# Patient Record
Sex: MALE | Race: WHITE | Employment: OTHER | URBAN - METROPOLITAN AREA
[De-identification: names, ages, dates, MRNs, and addresses within clinical notes are randomized per-mention and may not be internally consistent; named-entity substitution may affect disease eponyms.]

---

## 2017-05-09 ENCOUNTER — OFFICE VISIT (OUTPATIENT)
Dept: NEUROLOGY | Age: 27
End: 2017-05-09

## 2017-05-09 VITALS
HEART RATE: 68 BPM | BODY MASS INDEX: 22.24 KG/M2 | DIASTOLIC BLOOD PRESSURE: 68 MMHG | SYSTOLIC BLOOD PRESSURE: 98 MMHG | OXYGEN SATURATION: 99 % | WEIGHT: 167.8 LBS | RESPIRATION RATE: 14 BRPM | HEIGHT: 73 IN | TEMPERATURE: 97.8 F

## 2017-05-09 DIAGNOSIS — R51.9 ACUTE NONINTRACTABLE HEADACHE, UNSPECIFIED HEADACHE TYPE: Primary | ICD-10-CM

## 2017-05-09 DIAGNOSIS — R55 VASOVAGAL SYNCOPE: ICD-10-CM

## 2017-05-09 RX ORDER — IBUPROFEN 200 MG
800 TABLET ORAL
COMMUNITY

## 2017-05-09 NOTE — COMMUNICATION BODY
Nicol Roy is a 27 y.o., right handed male, with no past medical history, who on April 20, 2017 had a spell while stationed on a [address]  in the [employer]. He developed a crushing 10/10 headache which basically disabled him. He was attended to by the medics, during which he became poorly conscious. He actually had what was described as a generalized tonic clonic seizure activity. He was seen in the [facility] and then here at [facility].  He had a CT scan of the brain, not any MRI imaging. He's not had no further lose of consciousness. No lumbar puncture was performed in either location. The headache came on gradually, with only a little bit of warning there was some lightheadedness. He continues to have daily headaches. They vary from a low of 5/10 to a severe 10/10. He gets pain in either vertex region. He has nausea but no vomiting. There's no focal weakness or numbness, but feels generalized weakness since onset. He does have a prior history of headache, but they were far a few in between and never this intense. There's no known family history of headaches. Social History; he's single, currently in the [address] [place] [address] Street. Drinks occasionally, no tobacco, illicit drugs. Family History; no history of migraine. Father has no significant history. Mother has no history. Sister with breast cancer. Current Outpatient Prescriptions   Medication Sig Dispense Refill    ibuprofen (MOTRIN) 200 mg tablet Take  by mouth every six (6) hours as needed for Pain. History reviewed. No pertinent past medical history. History reviewed. No pertinent surgical history. No Known Allergies    There is no problem list on file for this patient.         Review of Systems:   As above otherwise 11 point review of systems negative including;   Constitutional no fever or chills  Skin denies rash or itching  HENT  Denies tinnitus, hearing lose  Eyes denies diplopia vision lose  Respiratory denies shortness of breath  Cardiovascular denies chest pain, dyspnea on exertion  Gastrointestinal denies nausea, vomiting, diarrhea, constipation  Genitourinary denies incontinence  Musculoskeletal denies joint pain or swelling  Endocrine denies weight change  Hematology denies easy bruising or bleeding   Neurological as above in HPI      PHYSICAL EXAMINATION:      VITAL SIGNS:    Visit Vitals    BP 98/68 (BP 1 Location: Left arm, BP Patient Position: Sitting)    Pulse 68    Temp 97.8 °F (36.6 °C) (Oral)    Resp 14    Ht 6' 1\" (1.854 m)    Wt 76.1 kg (167 lb 12.8 oz)    SpO2 99%    BMI 22.14 kg/m2       GENERAL: The patient is well developed, well nourished, and in no apparent distress. EXTREMITIES: No clubbing, cyanosis, or edema is identified. Pulses 2+ and symmetrical.  Muscle tone is normal.  HEAD:   Ear, nose, and throat appear to be without trauma. The patient is normocephalic. NEUROLOGIC EXAMINATION    MENTAL STATUS: The patient is awake, alert, and oriented x 4. Fund of knowledge is adequate. Speech is fluent and memory appears to be intact, both long and short term. CRANIAL NERVES: II - Visual fields are full to confrontation. Funduscopic examination reveals flat disks bilaterally. Pupils are both 6 mm and briskly reactive to light and accommodation. III, IV, VI - Extraocular movements are intact and there is no nystagmus. V - Facial sensation is intact to pinprick and light touch. VII - Face is symmetrical.   VIII - Hearing is present. IX, X, XII- Palate rises symmetrically. Gag is present. Tongue is in the midline. XI - Shoulder shrugging and head turning intact  MOTOR:  The patient is 5/5 in all four limbs without any drift. Fine finger movements are symmetrical.  Isolated motor group testing reveals no focal abnormalities. Tone is normal.  Sensory examination is intact to pinprick, light touch and position sense testing. Reflexes are 2+ and symmetrical. Plantars are down going. Cerebellar examination reveals no gross ataxia or dysmetria. Gait is normal and the patient can tandem walk without any difficulty. The discharge summary from [facility] states he had a CTA/CTV which was normal.  I have not seen the records. CBC: No results found for: WBC, RBC, HGB, HCT, PLT, HGBEXT, HCTEXT, PLTEXT  BMP: No results found for: GLU, NA, K, CL, CO2, BUN, CREA, CA  CMP: No results found for: GLU, NA, K, CL, CO2, BUN, CREA, CA, AGAP, BUCR, TBIL, GPT, AP, TP, ALB, GLOB, AGRAT  Coagulation: No results found for: PTP, INR, APTT, PTTT       Impression: Single episode of what sounds like a syncopal/seizure event but with persistent headaches in this man who has risk factors including none. He has no family history of migraines and no significant personal history of anything. I'm most worried that he had an intracranial bleed ie SAH, from either an aneurysm or AVM. Plan: I thing he needs an MRI scan for new onset headaches that are not resolving. He also needs and MRA of the brain looking for a reason for any bleed that may have occurred at that time. Return here in 2 weeks.

## 2017-05-09 NOTE — MR AVS SNAPSHOT
Visit Information Date & Time Provider Department Dept. Phone Encounter #  
 5/9/2017 10:00 AM Ankur [facility] [phone] [id number] Follow-up Instructions Return in about 1 week (around 5/16/2017). Follow-up and Disposition History Upcoming Health Maintenance Date Due DTaP/Tdap/Td series (1 - Tdap) 9/20/2013 INFLUENZA AGE 9 TO ADULT 8/1/2017 Allergies as of 5/9/2017  Review Complete On: 5/9/2017 By: Garima Marinelli LPN No Known Allergies Current Immunizations  Never Reviewed No immunizations on file. Not reviewed this visit You Were Diagnosed With   
  
 Codes Comments Acute nonintractable headache, unspecified headache type    -  Primary ICD-10-CM: R51 ICD-9-CM: 135. 0 Vasovagal syncope     ICD-10-CM: R55 
ICD-9-CM: 780. 2 Vitals BP Pulse Temp Resp Height(growth percentile) Weight(growth percentile) 98/68 (BP 1 Location: Left arm, BP Patient Position: Sitting) 68 97.8 °F (36.6 °C) (Oral) 14 6' 1\" (1.854 m) 167 lb 12.8 oz (76.1 kg) SpO2 BMI Smoking Status 99% 22.14 kg/m2 Never Smoker Vitals History BMI and BSA Data Body Mass Index Body Surface Area  
 22.14 kg/m 2 1.98 m 2 Your Updated Medication List  
  
   
This list is accurate as of: 5/9/17 11:12 AM.  Always use your most recent med list.  
  
  
  
  
 ibuprofen 200 mg tablet Commonly known as:  MOTRIN Take  by mouth every six (6) hours as needed for Pain. Follow-up Instructions Return in about 1 week (around 5/16/2017). To-Do List   
 05/09/2017 Imaging:  MRA BRAIN WO CONT   
  
 05/09/2017 Imaging:  MRI BRAIN WO CONT Introducing [facility]! Hai Ace introduces [employer] patient portal. Now you can access parts of your medical record, email your doctor's office, and request medication refills online.    
 
1. In your internet browser, go to [email or web address] 2. Click on the First Time User? Click Here link in the Sign In box. You will see the New Member Sign Up page. 3. Enter your [employer] Access Code exactly as it appears below. You will not need to use this code after youve completed the sign-up process. If you do not sign up before the expiration date, you must request a new code. · [employer] Access Code: [email or web address] Expires: 8/7/2017  9:35 AM 
 
4. Enter the last four digits of your Social Security Number (xxxx) and Date of Birth (mm/dd/yyyy) as indicated and click Submit. You will be taken to the next sign-up page. 5. Create a [employer] ID. This will be your [employer] login ID and cannot be changed, so think of one that is secure and easy to remember. 6. Create a [employer] password. You can change your password at any time. 7. Enter your Password Reset Question and Answer. This can be used at a later time if you forget your password. 8. Enter your e-mail address. You will receive e-mail notification when new information is available in [address] 9. Click Sign Up. You can now view and download portions of your medical record. 10. Click the Download Summary menu link to download a portable copy of your medical information. If you have questions, please visit the Frequently Asked Questions section of the [employer] website. Remember, [employer] is NOT to be used for urgent needs. For medical emergencies, dial 911. Now available from your iPhone and Android! Please provide this summary of care documentation to your next provider. If you have any questions after today's visit, please call [phone].

## 2017-05-09 NOTE — PROGRESS NOTES
Andrew Almanzar is a 27 y.o., right handed male, with no past medical history, who on April 20, 2017 had a spell while stationed on a [address]  in the [employer]. He developed a crushing 10/10 headache which basically disabled him. He was attended to by the medics, during which he became poorly conscious. He actually had what was described as a generalized tonic clonic seizure activity. He was seen in the [facility] and then here at [facility].  He had a CT scan of the brain, not any MRI imaging. He's not had no further lose of consciousness. No lumbar puncture was performed in either location. The headache came on gradually, with only a little bit of warning there was some lightheadedness. He continues to have daily headaches. They vary from a low of 5/10 to a severe 10/10. He gets pain in either vertex region. He has nausea but no vomiting. There's no focal weakness or numbness, but feels generalized weakness since onset. He does have a prior history of headache, but they were far a few in between and never this intense. There's no known family history of headaches. Social History; he's single, currently in the [address] [place] [address] Street. Drinks occasionally, no tobacco, illicit drugs. Family History; no history of migraine. Father has no significant history. Mother has no history. Sister with breast cancer. Current Outpatient Prescriptions   Medication Sig Dispense Refill    ibuprofen (MOTRIN) 200 mg tablet Take  by mouth every six (6) hours as needed for Pain. History reviewed. No pertinent past medical history. History reviewed. No pertinent surgical history. No Known Allergies    There is no problem list on file for this patient.         Review of Systems:   As above otherwise 11 point review of systems negative including;   Constitutional no fever or chills  Skin denies rash or itching  HENT  Denies tinnitus, hearing lose  Eyes denies diplopia vision lose  Respiratory denies shortness of breath  Cardiovascular denies chest pain, dyspnea on exertion  Gastrointestinal denies nausea, vomiting, diarrhea, constipation  Genitourinary denies incontinence  Musculoskeletal denies joint pain or swelling  Endocrine denies weight change  Hematology denies easy bruising or bleeding   Neurological as above in HPI      PHYSICAL EXAMINATION:      VITAL SIGNS:    Visit Vitals    BP 98/68 (BP 1 Location: Left arm, BP Patient Position: Sitting)    Pulse 68    Temp 97.8 °F (36.6 °C) (Oral)    Resp 14    Ht 6' 1\" (1.854 m)    Wt 76.1 kg (167 lb 12.8 oz)    SpO2 99%    BMI 22.14 kg/m2       GENERAL: The patient is well developed, well nourished, and in no apparent distress. EXTREMITIES: No clubbing, cyanosis, or edema is identified. Pulses 2+ and symmetrical.  Muscle tone is normal.  HEAD:   Ear, nose, and throat appear to be without trauma. The patient is normocephalic. NEUROLOGIC EXAMINATION    MENTAL STATUS: The patient is awake, alert, and oriented x 4. Fund of knowledge is adequate. Speech is fluent and memory appears to be intact, both long and short term. CRANIAL NERVES: II - Visual fields are full to confrontation. Funduscopic examination reveals flat disks bilaterally. Pupils are both 6 mm and briskly reactive to light and accommodation. III, IV, VI - Extraocular movements are intact and there is no nystagmus. V - Facial sensation is intact to pinprick and light touch. VII - Face is symmetrical.   VIII - Hearing is present. IX, X, XII- Palate rises symmetrically. Gag is present. Tongue is in the midline. XI - Shoulder shrugging and head turning intact  MOTOR:  The patient is 5/5 in all four limbs without any drift. Fine finger movements are symmetrical.  Isolated motor group testing reveals no focal abnormalities. Tone is normal.  Sensory examination is intact to pinprick, light touch and position sense testing. Reflexes are 2+ and symmetrical. Plantars are down going. Cerebellar examination reveals no gross ataxia or dysmetria. Gait is normal and the patient can tandem walk without any difficulty. The discharge summary from [facility] states he had a CTA/CTV which was normal.  I have not seen the records. CBC: No results found for: WBC, RBC, HGB, HCT, PLT, HGBEXT, HCTEXT, PLTEXT  BMP: No results found for: GLU, NA, K, CL, CO2, BUN, CREA, CA  CMP: No results found for: GLU, NA, K, CL, CO2, BUN, CREA, CA, AGAP, BUCR, TBIL, GPT, AP, TP, ALB, GLOB, AGRAT  Coagulation: No results found for: PTP, INR, APTT, PTTT       Impression: Single episode of what sounds like a syncopal/seizure event but with persistent headaches in this man who has risk factors including none. He has no family history of migraines and no significant personal history of anything. I'm most worried that he had an intracranial bleed ie SAH, from either an aneurysm or AVM. Plan: I thing he needs an MRI scan for new onset headaches that are not resolving. He also needs and MRA of the brain looking for a reason for any bleed that may have occurred at that time. Return here in 2 weeks.

## 2017-05-10 ENCOUNTER — HOSPITAL ENCOUNTER (OUTPATIENT)
Dept: MRI IMAGING | Age: 27
Discharge: HOME | End: 2017-05-10
Attending: PSYCHIATRY & NEUROLOGY
Payer: OTHER GOVERNMENT

## 2017-05-10 DIAGNOSIS — R55 VASOVAGAL SYNCOPE: ICD-10-CM

## 2017-05-10 DIAGNOSIS — R51.9 ACUTE NONINTRACTABLE HEADACHE, UNSPECIFIED HEADACHE TYPE: ICD-10-CM

## 2017-05-10 PROCEDURE — 70551 MRI BRAIN STEM W/O DYE: CPT

## 2017-05-10 PROCEDURE — 70544 MR ANGIOGRAPHY HEAD W/O DYE: CPT

## 2017-05-11 NOTE — PROGRESS NOTES
Informed patient of results and any/all instructions. Patient denied any further questions and verbalized understanding.

## 2017-05-16 ENCOUNTER — OFFICE VISIT (OUTPATIENT)
Dept: NEUROLOGY | Age: 27
End: 2017-05-16

## 2017-05-16 VITALS
BODY MASS INDEX: 20.62 KG/M2 | OXYGEN SATURATION: 96 % | TEMPERATURE: 98.2 F | SYSTOLIC BLOOD PRESSURE: 110 MMHG | HEIGHT: 75 IN | RESPIRATION RATE: 14 BRPM | DIASTOLIC BLOOD PRESSURE: 72 MMHG | HEART RATE: 79 BPM | WEIGHT: 165.8 LBS

## 2017-05-16 DIAGNOSIS — G43.011 INTRACTABLE MIGRAINE WITHOUT AURA AND WITH STATUS MIGRAINOSUS: Primary | ICD-10-CM

## 2017-05-16 RX ORDER — METHYLPREDNISOLONE 4 MG/1
TABLET ORAL
Qty: 1 DOSE PACK | Refills: 0 | Status: SHIPPED | OUTPATIENT
Start: 2017-05-16 | End: 2017-06-21 | Stop reason: ALTCHOICE

## 2017-05-16 RX ORDER — TOPIRAMATE 25 MG/1
25 TABLET ORAL 2 TIMES DAILY
Qty: 60 TAB | Refills: 2 | Status: SHIPPED | OUTPATIENT
Start: 2017-05-16 | End: 2017-05-26 | Stop reason: SDUPTHER

## 2017-05-16 NOTE — PROGRESS NOTES
Re:  Artist Braxton Boyd,Follow up visit     5/16/2017 9:59 AM    SSN: [id number]    Subjective:   Reena Womack returns for follow up. He's been having a rough time, with continuous headaches and significant nausea over the weekend. Basically he's had a headache that's lasted 3 weeks. No further loss of consciousness spells. Medications:    Current Outpatient Prescriptions   Medication Sig Dispense Refill    ibuprofen (MOTRIN) 200 mg tablet Take  by mouth every six (6) hours as needed for Pain. Vital signs:    Visit Vitals    /72 (BP 1 Location: Left arm, BP Patient Position: Sitting)    Pulse 79    Temp 98.2 °F (36.8 °C) (Oral)    Resp 14    Ht 6' 3\" (1.905 m)    Wt 75.2 kg (165 lb 12.8 oz)    SpO2 96%    BMI 20.72 kg/m2       Review of Systems:   As above otherwise 11 point review of systems negative including;   Constitutional no fever or chills  Skin denies rash or itching  HEENT  Denies tinnitus, hearing lose  Eyes denies diplopia vision lose  Respiratory denies sortness of breath  Cardiovascular denies chest pain, dyspnea on exertion  Gastrointestinal denies nausea, vomiting, diarrhea, constipation  Genitourinary denies incontinence  Musculoskeletal denies joint pain or swelling  Endocrine denies weight change  Hematology denies easy bruising or bleeding   Neurological as above in HPI      There is no problem list on file for this patient. Objective: The patient is awake, alert, and oriented x 4. Fund of knowledge is adequate. Speech is fluent and memory is intact. Cranial Nerves: II - Visual fields are full to confrontation. III, IV, VI - Extraocular movements are intact. There is no nystagmus. V - Facial sensation is intact to pinprick. VII - Face is symmetrical.  VIII - Hearing is present. IX, X, XII - Palate is symmetrical.   XI - Shoulder shrugging and head turning intact  Motor: The patient moves all four limbs fairly well and symmetrically.  Tone is normal. Reflexes are 2+ and symmetrical. Plantars are down going. Gait is normal.    History: Syncope, headache, worst headache of life     Prior studies not available for comparison, correlation to MRA brain same day     PROCEDURE:     Axial spin echo T1, FSE T2, FLAIR, T2 gradient and diffusion sequences, sagittal  spin echo T1, and coronal spin echo T1 and T2 sequences of the brain were  obtained without gadolinium.     FINDINGS:      Diffusion: There are no areas of restricted diffusion, no evidence of an acute  infarction.     Brain parenchyma: No evidence of intracranial mass hemorrhage or mass effect. Curvilinear areas of signal abnormality adjacent to the cortex of the superior  lateral left frontal lobe likely developmental venous anomaly. No other cortical  or white matter signal abnormality.     Ventricles and sulci: Normal. No significant atrophy given age.     Extra-axial: No extra-axial fluid collection or mass is noted.     Brain vasculature: No vascular abnormality is appreciated on this routine brain  MR examination.     Craniocervical junction: Normal.     Skull base, extracranial and calvarium: Mucosal thickening with likely  retention cysts left maxillary sinus especially inferiorly with mild additional  mucosal thickening right inferior frontal sinus, ethmoid and right maxillary  sinuses without fluid level. Orbits IACs and mastoids sella, and skull  unremarkable.     IMPRESSION  Impression:           1. No evidence of acute or other significant intracranial finding.     2. Likely developmental venous anomaly left superior lateral frontal lobe.     3. Paranasal sinus mucosal inflammatory disease most pronounced left maxillary  sinus, no fluid level.     Final result (Exam End: 5/10/2017  3:29 PM) Reviewed    Study Result   History: Worst headache of life, syncope     Correlation brain MRI same day     PROCEDURE:     3-D time-of-flight intracranial MRA was performed with multiple MIP  reconstructed images obtained.     FINDINGS: No significant distal ICA stenosis. Codominant normal-appearing distal  vertebral arteries and normal basilar artery. Very small patent left posterior  communicating artery and patent anterior communicating artery. No cerebral  artery stenosis filling defect or occlusion. No evidence of aneurysm or vascular  malformation.     IMPRESSION  IMPRESSION: Normal brain MRA. I have reviewed the above imagines myself. CBC: No results found for: WBC, RBC, HGB, HCT, PLT, HGBEXT, HCTEXT, PLTEXT  BMP: No results found for: GLU, NA, K, CL, CO2, BUN, CREA, CA  CMP: No results found for: GLU, NA, K, CL, CO2, BUN, CREA, CA, AGAP, BUCR, TBIL, GPT, AP, TP, ALB, GLOB, AGRAT  Coagulation: No results found for: PTP, INR, APTT, PTTT    Assessment:  Worst headache of his life with persistent pain and syncope episode who has risk factors including none. Negative workup for source of headaches. Is this new onset migraine? Did he have a spontaneous SAH with no source? Plan: Will start him on low dose Topamax 25mg BID to start. Will also give him a Medrol Dosepak to get rid of the current pain. Return here in 1 week. Sincerely,        Megan [employer].  Adeel Fisher M.D.

## 2017-05-16 NOTE — MR AVS SNAPSHOT
Visit Information Date & Time Provider Department Dept. Phone Encounter #  
 5/16/2017  9:20 AM Mag Zuniga MD [facility] [phone] [id number] Follow-up Instructions Return in about 2 weeks (around 5/30/2017). Follow-up and Disposition History Upcoming Health Maintenance Date Due DTaP/Tdap/Td series (1 - Tdap) 9/20/2013 INFLUENZA AGE 9 TO ADULT 8/1/2017 Allergies as of 5/16/2017  Review Complete On: 5/16/2017 By: Eugenia Cha LPN No Known Allergies Current Immunizations  Never Reviewed No immunizations on file. Not reviewed this visit You Were Diagnosed With   
  
 Codes Comments Intractable migraine without aura and with status migrainosus    -  Primary ICD-10-CM: Y38.805 
ICD-9-CM: 346.13 Vitals BP Pulse Temp Resp Height(growth percentile) Weight(growth percentile) 110/72 (BP 1 Location: Left arm, BP Patient Position: Sitting) 79 98.2 °F (36.8 °C) (Oral) 14 6' 3\" (1.905 m) 165 lb 12.8 oz (75.2 kg) SpO2 BMI Smoking Status 96% 20.72 kg/m2 Never Smoker Vitals History BMI and BSA Data Body Mass Index Body Surface Area 20.72 kg/m 2 1.99 m 2 Your Updated Medication List  
  
   
This list is accurate as of: 5/16/17 10:16 AM.  Always use your most recent med list.  
  
  
  
  
 ibuprofen 200 mg tablet Commonly known as:  MOTRIN Take  by mouth every six (6) hours as needed for Pain. methylPREDNISolone 4 mg tablet Commonly known as:  [place] Number As directed  
  
 topiramate 25 mg tablet Commonly known as:  TOPAMAX Take 1 Tab by mouth two (2) times a day. Prescriptions Printed Refills  
 methylPREDNISolone (MEDROL DOSEPACK) 4 mg tablet 0 Sig: As directed Class: Print  
 topiramate (TOPAMAX) 25 mg tablet 2 Sig: Take 1 Tab by mouth two (2) times a day. Class: Print Route: Oral  
  
Follow-up Instructions Return in about 2 weeks (around 5/30/2017). Introducing [facility]! Rhett Hung introduces [employer] patient portal. Now you can access parts of your medical record, email your doctor's office, and request medication refills online. 1. In your internet browser, go to [email or web address] 2. Click on the First Time User? Click Here link in the Sign In box. You will see the New Member Sign Up page. 3. Enter your [employer] Access Code exactly as it appears below. You will not need to use this code after youve completed the sign-up process. If you do not sign up before the expiration date, you must request a new code. · [employer] Access Code: [email or web address] Expires: 8/7/2017  9:35 AM 
 
4. Enter the last four digits of your Social Security Number (xxxx) and Date of Birth (mm/dd/yyyy) as indicated and click Submit. You will be taken to the next sign-up page. 5. Create a [employer] ID. This will be your [employer] login ID and cannot be changed, so think of one that is secure and easy to remember. 6. Create a [employer] password. You can change your password at any time. 7. Enter your Password Reset Question and Answer. This can be used at a later time if you forget your password. 8. Enter your e-mail address. You will receive e-mail notification when new information is available in [address] 9. Click Sign Up. You can now view and download portions of your medical record. 10. Click the Download Summary menu link to download a portable copy of your medical information. If you have questions, please visit the Frequently Asked Questions section of the [employer] website. Remember, [employer] is NOT to be used for urgent needs. For medical emergencies, dial 911. Now available from your iPhone and Android! Please provide this summary of care documentation to your next provider. Your primary care clinician is listed as NONE.  If you have any questions after today's visit, please call [phone].

## 2017-05-19 ENCOUNTER — DOCUMENTATION ONLY (OUTPATIENT)
Dept: NEUROLOGY | Age: 27
End: 2017-05-19

## 2017-05-19 DIAGNOSIS — R56.9 SEIZURE (HCC): Primary | ICD-10-CM

## 2017-05-24 PROBLEM — R56.9 SEIZURE (HCC): Status: ACTIVE | Noted: 2017-05-24

## 2017-05-26 ENCOUNTER — OFFICE VISIT (OUTPATIENT)
Dept: NEUROLOGY | Age: 27
End: 2017-05-26

## 2017-05-26 VITALS
OXYGEN SATURATION: 98 % | SYSTOLIC BLOOD PRESSURE: 110 MMHG | WEIGHT: 167.6 LBS | TEMPERATURE: 97 F | HEIGHT: 75 IN | RESPIRATION RATE: 18 BRPM | DIASTOLIC BLOOD PRESSURE: 60 MMHG | HEART RATE: 81 BPM | BODY MASS INDEX: 20.84 KG/M2

## 2017-05-26 DIAGNOSIS — G43.011 INTRACTABLE MIGRAINE WITHOUT AURA AND WITH STATUS MIGRAINOSUS: ICD-10-CM

## 2017-05-26 DIAGNOSIS — R56.9 SEIZURE (HCC): Primary | ICD-10-CM

## 2017-05-26 RX ORDER — TOPIRAMATE 50 MG/1
50 TABLET, FILM COATED ORAL 2 TIMES DAILY
Qty: 60 TAB | Refills: 6 | Status: SHIPPED | OUTPATIENT
Start: 2017-05-26

## 2017-05-26 NOTE — MR AVS SNAPSHOT
Visit Information Date & Time Provider Department Dept. Phone Encounter #  
 5/26/2017  2:20 PM Nina Romero MD [employer] [phone] [id number] Follow-up Instructions Return in about 3 weeks (around 6/16/2017). Follow-up and Disposition History Upcoming Health Maintenance Date Due DTaP/Tdap/Td series (1 - Tdap) 9/20/2013 INFLUENZA AGE 9 TO ADULT 8/1/2017 Allergies as of 5/26/2017  Review Complete On: 5/26/2017 By: Toña Miranda LPN No Known Allergies Current Immunizations  Never Reviewed No immunizations on file. Not reviewed this visit You Were Diagnosed With   
  
 Codes Comments [facility]izure ([facility]    -  Primary ICD-10-CM: R56.9 ICD-9-CM: 780.39 Intractable migraine without aura and with status migrainosus     ICD-10-CM: G43.011 
ICD-9-CM: 346.13 Vitals BP Pulse Temp Resp Height(growth percentile) Weight(growth percentile) 110/60 81 97 °F (36.1 °C) (Temporal) 18 6' 3\" (1.905 m) 167 lb 9.6 oz (76 kg) SpO2 BMI Smoking Status 98% 20.95 kg/m2 Never Smoker Vitals History BMI and BSA Data Body Mass Index Body Surface Area  
 20.95 kg/m 2 2.01 m 2 Preferred Pharmacy Pharmacy Name Phone [facility] [address] [place] [phone] Your Updated Medication List  
  
   
This list is accurate as of: 5/26/17  3:00 PM.  Always use your most recent med list.  
  
  
  
  
 ibuprofen 200 mg tablet Commonly known as:  MOTRIN Take  by mouth every six (6) hours as needed for Pain. methylPREDNISolone 4 mg tablet Commonly known as:  Lia Misti As directed  
  
 topiramate 50 mg tablet Commonly known as:  TOPAMAX Take 1 Tab by mouth two (2) times a day. Indications: MIGRAINE PREVENTION, SIMPLE-PARTIAL EPILEPSY Prescriptions Sent to Pharmacy Refills topiramate (TOPAMAX) 50 mg tablet 6 Sig: Take 1 Tab by mouth two (2) times a day. Indications: MIGRAINE PREVENTION, SIMPLE-PARTIAL EPILEPSY Class: Normal  
 Pharmacy: [facility] [address] [place] [address] Ciro Ventura [address] [place] [address] #: [phone] Route: Oral  
  
Follow-up Instructions Return in about 3 weeks (around 6/16/2017). To-Do List   
 05/26/2017 Neurology:  EEG AWAKE AND ASLEEP Introducing [facility]! Katalina George introduces [employer] patient portal. Now you can access parts of your medical record, email your doctor's office, and request medication refills online. 1. In your internet browser, go to [email or web address] 2. Click on the First Time User? Click Here link in the Sign In box. You will see the New Member Sign Up page. 3. Enter your [employer] Access Code exactly as it appears below. You will not need to use this code after youve completed the sign-up process. If you do not sign up before the expiration date, you must request a new code. · [employer] Access Code: [email or web address] Expires: 8/7/2017  9:35 AM 
 
4. Enter the last four digits of your Social Security Number (xxxx) and Date of Birth (mm/dd/yyyy) as indicated and click Submit. You will be taken to the next sign-up page. 5. Create a [employer] ID. This will be your [employer] login ID and cannot be changed, so think of one that is secure and easy to remember. 6. Create a [employer] password. You can change your password at any time. 7. Enter your Password Reset Question and Answer. This can be used at a later time if you forget your password. 8. Enter your e-mail address. You will receive e-mail notification when new information is available in [address] 9. Click Sign Up. You can now view and download portions of your medical record. 10. Click the Download Summary menu link to download a portable copy of your medical information. If you have questions, please visit the Frequently Asked Questions section of the [employer] website. Remember, [employer] is NOT to be used for urgent needs. For medical emergencies, dial 911. Now available from your iPhone and Android! Please provide this summary of care documentation to your next provider. Your primary care clinician is listed as NONE. If you have any questions after today's visit, please call [phone].

## 2017-05-26 NOTE — COMMUNICATION BODY
Re:  Carol Boyd,Follow up visit     5/26/2017 2:42 PM    SSN: [id number]    Subjective:   Mahin Jolly returns for follow up. He has had some better days with some less headaches over the last month. Tolerating the Topamax without any apparent problems. He had a single seizure on May 18, apparently witnessed while he was at work. He was seen at [facility] and released on some Klonopin. He hasn't had any further seizure activity. Medications:    Current Outpatient Prescriptions   Medication Sig Dispense Refill    topiramate (TOPAMAX) 25 mg tablet Take 1 Tab by mouth two (2) times a day. 60 Tab 2    ibuprofen (MOTRIN) 200 mg tablet Take  by mouth every six (6) hours as needed for Pain.  methylPREDNISolone (MEDROL DOSEPACK) 4 mg tablet As directed 1 Dose Pack 0       Vital signs:    Visit Vitals    /60    Pulse 81    Temp 97 °F (36.1 °C) (Temporal)    Resp 18    Ht 6' 3\" (1.905 m)    Wt 76 kg (167 lb 9.6 oz)    SpO2 98%    BMI 20.95 kg/m2       Review of Systems:   As above otherwise 11 point review of systems negative including;   Constitutional no fever or chills  Skin denies rash or itching  HEENT  Denies tinnitus, hearing lose  Eyes denies diplopia vision lose  Respiratory denies sortness of breath  Cardiovascular denies chest pain, dyspnea on exertion  Gastrointestinal denies nausea, vomiting, diarrhea, constipation  Genitourinary denies incontinence  Musculoskeletal denies joint pain or swelling  Endocrine denies weight change  Hematology denies easy bruising or bleeding   Neurological as above in HPI      Patient Active Problem List   Diagnosis Code    Intractable migraine without aura and with status migrainosus G43.011    Seiz[facility] ([facility] R56.9         Objective: The patient is awake, alert, and oriented x 4. Fund of knowledge is adequate. Speech is fluent and memory is intact. Cranial Nerves: II - Visual fields are full to confrontation.   III, IV, VI - Extraocular movements are intact. There is no nystagmus. V - Facial sensation is intact to pinprick. VII - Face is symmetrical.  VIII - Hearing is present. IX, X, XII - Palate is symmetrical.   XI - Shoulder shrugging and head turning intact  Motor: The patient moves all four limbs fairly well and symmetrically. Tone is normal. Reflexes are 2+ and symmetrical. Plantars are down going. Gait is normal.    CBC: No results found for: WBC, RBC, HGB, HCT, PLT, HGBEXT, HCTEXT, PLTEXT  BMP: No results found for: GLU, NA, K, CL, CO2, BUN, CREA, CA  CMP: No results found for: GLU, NA, K, CL, CO2, BUN, CREA, CA, AGAP, BUCR, TBIL, GPT, AP, TP, ALB, GLOB, AGRAT  Coagulation: No results found for: PTP, INR, APTT, PTTT    Assessment:  Worst headache of his life with persistent pain and syncope episode who has risk factors including none. Negative workup for source of headaches. Is this new onset migraine? Did he have a spontaneous SAH with no source? 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 Plan: Will increase the Topamax to 50 mg BID to further help with the headache and control the seizures. Will also get an EEG at this time. He was advised not to drive for the next 6 months and until he's given permission by the state. Sincerely,        Genia Todd.  Dory Fuentes M.D.

## 2017-05-26 NOTE — LETTER
5/26/2017 2:57 PM 
 
Patient:  Marium Arevalo YOB: 1990 Date of Visit: 5/26/2017 Dear Vanessa Chong PA-C 
[address] [place] [address] VIA Facsimile: [phone] 
 : Thank you for referring Mr. Maryuri Ventura to me for evaluation/treatment. Below are the relevant portions of my assessment and plan of care. Re:  Carmelo Boyd,Follow up visit     5/26/2017 2:42 PM 
 
SSN: [id number] Subjective:   Marium Arevalo returns for follow up. He has had some better days with some less headaches over the last month. Tolerating the Topamax without any apparent problems. He had a single seizure on May 18, apparently witnessed while he was at work. He was seen at [facility] and released on some Klonopin. He hasn't had any further seizure activity. Medications:   
Current Outpatient Prescriptions Medication Sig Dispense Refill  topiramate (TOPAMAX) 25 mg tablet Take 1 Tab by mouth two (2) times a day. 60 Tab 2  ibuprofen (MOTRIN) 200 mg tablet Take  by mouth every six (6) hours as needed for Pain.  methylPREDNISolone (MEDROL DOSEPACK) 4 mg tablet As directed 1 Dose Pack 0 Vital signs:   
Visit Vitals  /60  Pulse 81  Temp 97 °F (36.1 °C) (Temporal)  Resp 18  Ht 6' 3\" (1.905 m)  Wt 76 kg (167 lb 9.6 oz)  SpO2 98%  BMI 20.95 kg/m2 Review of Systems: As above otherwise 11 point review of systems negative including;  
Constitutional no fever or chills Skin denies rash or itching HEENT  Denies tinnitus, hearing lose Eyes denies diplopia vision lose Respiratory denies sortness of breath Cardiovascular denies chest pain, dyspnea on exertion Gastrointestinal denies nausea, vomiting, diarrhea, constipation Genitourinary denies incontinence Musculoskeletal denies joint pain or swelling Endocrine denies weight change Hematology denies easy bruising or bleeding Neurological as above in HPI Patient Active Problem List  
Diagnosis Code  Intractable migraine without aura and with status migrainosus G43.011  
 Seizure ([facility] R56.9 Objective: The patient is awake, alert, and oriented x 4. Fund of knowledge is adequate. Speech is fluent and memory is intact. Cranial Nerves: II  Visual fields are full to confrontation. III, IV, VI  Extraocular movements are intact. There is no nystagmus. V  Facial sensation is intact to pinprick. VII  Face is symmetrical.  VIII - Hearing is present. IX, X, XII  Palate is symmetrical.   XI - Shoulder shrugging and head turning intact Motor: The patient moves all four limbs fairly well and symmetrically. Tone is normal. Reflexes are 2+ and symmetrical. Plantars are down going. Gait is normal. 
 
CBC: No results found for: WBC, RBC, HGB, HCT, PLT, HGBEXT, HCTEXT, PLTEXT 
BMP: No results found for: GLU, NA, K, CL, CO2, BUN, CREA, CA 
CMP: No results found for: GLU, NA, K, CL, CO2, BUN, CREA, CA, AGAP, BUCR, TBIL, GPT, AP, TP, ALB, GLOB, AGRAT Coagulation: No results found for: PTP, INR, APTT, PTTT Assessment:  Worst headache of his life with persistent pain and syncope episode who has risk factors including none. Negative workup for source of headaches. Is this new onset migraine? Did he have a spontaneous SAH with no source? 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 Plan: Will increase the Topamax to 50 mg BID to further help with the headache and control the seizures. Will also get an EEG at this time. He was advised not to drive for the next 6 months and until he's given permission by the state. Sincerely, 
 
 
 
Emily Davis. Yasmani Polanco M.D. If you have questions, please do not hesitate to call me. I look forward to following Mr. Boyd along with you.  
 
 
 
Sincerely, 
 
 
Reagan Guerra MD

## 2017-06-02 ENCOUNTER — DOCUMENTATION ONLY (OUTPATIENT)
Dept: NEUROLOGY | Age: 27
End: 2017-06-02

## 2017-06-02 NOTE — PROGRESS NOTES
Spoke with patient and informed him that his insurance is requiring a prior authorization for his EEG. Patient verbalized understanding and states he will get in touch with his PCP office to request it.

## 2017-06-06 ENCOUNTER — HOSPITAL ENCOUNTER (OUTPATIENT)
Dept: NEUROLOGY | Age: 27
Discharge: HOME | End: 2017-06-06
Attending: PSYCHIATRY & NEUROLOGY
Payer: OTHER GOVERNMENT

## 2017-06-06 DIAGNOSIS — R56.9 SEIZURE (HCC): ICD-10-CM

## 2017-06-06 DIAGNOSIS — G43.011 INTRACTABLE MIGRAINE WITHOUT AURA AND WITH STATUS MIGRAINOSUS: ICD-10-CM

## 2017-06-06 PROCEDURE — 95819 EEG AWAKE AND ASLEEP: CPT

## 2017-06-15 ENCOUNTER — TELEPHONE (OUTPATIENT)
Dept: NEUROLOGY | Age: 27
End: 2017-06-15

## 2017-06-15 NOTE — TELEPHONE ENCOUNTER
Justin Shen from [address] [place] [address] to say that they did EEG there and did not see anything wrong. They are requesting phone call from Flaca Keita to discuss finding of EEG from [facility].   Best # to call is [phone]

## 2017-06-21 ENCOUNTER — OFFICE VISIT (OUTPATIENT)
Dept: NEUROLOGY | Age: 27
End: 2017-06-21

## 2017-06-21 VITALS
OXYGEN SATURATION: 98 % | BODY MASS INDEX: 19.72 KG/M2 | HEART RATE: 97 BPM | SYSTOLIC BLOOD PRESSURE: 112 MMHG | HEIGHT: 75 IN | DIASTOLIC BLOOD PRESSURE: 72 MMHG | WEIGHT: 158.6 LBS | RESPIRATION RATE: 12 BRPM | TEMPERATURE: 98.2 F

## 2017-06-21 DIAGNOSIS — G43.011 INTRACTABLE MIGRAINE WITHOUT AURA AND WITH STATUS MIGRAINOSUS: Primary | ICD-10-CM

## 2017-06-21 DIAGNOSIS — R56.9 SEIZURE (HCC): ICD-10-CM

## 2017-06-21 RX ORDER — ACETAMINOPHEN 325 MG/1
TABLET ORAL
COMMUNITY
Start: 2017-06-15

## 2017-06-21 NOTE — MR AVS SNAPSHOT
Visit Information Date & Time Provider Department Dept. Phone Encounter #  
 6/21/2017  3:00 PM Bety Lui MD [facility] [phone] [id number] Follow-up Instructions Return if symptoms worsen or fail to improve. Follow-up and Disposition History Upcoming Health Maintenance Date Due DTaP/Tdap/Td series (1 - Tdap) 9/20/2013 INFLUENZA AGE 9 TO ADULT 8/1/2017 Allergies as of 6/21/2017  Review Complete On: 5/26/2017 By: Saran Samson LPN No Known Allergies Current Immunizations  Never Reviewed No immunizations on file. Not reviewed this visit You Were Diagnosed With   
  
 Codes Comments Intractable migraine without aura and with status migrainosus    -  Primary ICD-10-CM: C42.072 
ICD-9-CM: 346.13 Seizure ([address]     ICD-10-CM: R56.9 ICD-9-CM: 780.39 Vitals BP Pulse Temp Resp Height(growth percentile) Weight(growth percentile) 112/72 (BP 1 Location: Right arm, BP Patient Position: Sitting) 97 98.2 °F (36.8 °C) (Oral) 12 6' 3\" (1.905 m) 158 lb 9.6 oz (71.9 kg) SpO2 BMI Smoking Status 98% 19.82 kg/m2 Never Smoker BMI and BSA Data Body Mass Index Body Surface Area  
 19.82 kg/m 2 1.95 m 2 Preferred Pharmacy Pharmacy Name Phone [facility] [address] [place] [phone] Your Updated Medication List  
  
   
This list is accurate as of: 6/21/17  3:39 PM.  Always use your most recent med list.  
  
  
  
  
 ibuprofen 200 mg tablet Commonly known as:  MOTRIN Take 800 mg by mouth two (2) times daily as needed for Pain. Mapap 325 mg tablet Generic drug:  acetaminophen  
  
 topiramate 50 mg tablet Commonly known as:  TOPAMAX Take 1 Tab by mouth two (2) times a day. Indications: MIGRAINE PREVENTION, SIMPLE-PARTIAL EPILEPSY Follow-up Instructions Return if symptoms worsen or fail to improve. Introducing [facility]! Wendyese Aguilar introduces [employer] patient portal. Now you can access parts of your medical record, email your doctor's office, and request medication refills online. 1. In your internet browser, go to [email or web address] 2. Click on the First Time User? Click Here link in the Sign In box. You will see the New Member Sign Up page. 3. Enter your [employer] Access Code exactly as it appears below. You will not need to use this code after youve completed the sign-up process. If you do not sign up before the expiration date, you must request a new code. · [employer] Access Code: [email or web address] Expires: 8/7/2017  9:35 AM 
 
4. Enter the last four digits of your Social Security Number (xxxx) and Date of Birth (mm/dd/yyyy) as indicated and click Submit. You will be taken to the next sign-up page. 5. Create a [employer] ID. This will be your [employer] login ID and cannot be changed, so think of one that is secure and easy to remember. 6. Create a [employer] password. You can change your password at any time. 7. Enter your Password Reset Question and Answer. This can be used at a later time if you forget your password. 8. Enter your e-mail address. You will receive e-mail notification when new information is available in [address] 9. Click Sign Up. You can now view and download portions of your medical record. 10. Click the Download Summary menu link to download a portable copy of your medical information. If you have questions, please visit the Frequently Asked Questions section of the [employer] website. Remember, [employer] is NOT to be used for urgent needs. For medical emergencies, dial 911. Now available from your iPhone and Android! Please provide this summary of care documentation to your next provider. Your primary care clinician is listed as NONE.  If you have any questions after today's visit, please call [phone].

## 2017-06-21 NOTE — PROCEDURES
[place]    Name:  Issac Be  MR#:  [id number]  :  1990  Account #:  [de-identified]  Date of Adm:  2017  Date of Service:  2017      ELECTROENCEPHALOGRAM NUMBER: [facility] . CLINICAL: This is an apparently wakeful EEG on this 27year-old with  past medical history of having had a seizure-like spell back on  2017. He was on vacation in the [facility]. MEDICATIONS: Currently include ibuprofen. EEG REPORT: The predominant, apparently wakeful background  consists of 10-15 microvolt, irregular but symmetrical, 9-10 Hz waves  which attenuate well with eye opening. Bifrontal 2 to 3 microvolt, 16-20  Hz activity is seen which is symmetrical in its occurrence. Step-flash photic stimulation was performed that caused no change in  this record. Hyperventilation was performed that caused no change in  the recording. IMPRESSION: Normal wakeful electroencephalogram. No epileptiform  or focal abnormality was identified.         MD Lani Pinon / Katie Santiago  D:  2017   11:18  T:  2017   12:11  Job #:  [id number]

## 2017-06-21 NOTE — PROGRESS NOTES
Re:  Matt Boyd,Follow up visit     6/21/2017 3:22 PM      SSN: [id number]    Subjective:   Demetrio Loomis returns for follow up. He was taken off the Topamax by the [facility] neurologist who felt that the second event he had was an anxiety spell of some sort. Since coming off of the Topamax the patient has continued to have daily headaches, now less intense than they were, about 5/10. He says that he's taking Tylenol or ibuprofen that helps. He's recently been hospitalized at the [facility] after having a spinal headache post LP. The LP was negative. He's basically been managed for pain management at this time. He's also complaining of recent onset of recurrent diarrhea. He feels tired all the time. Medications:    Current Outpatient Prescriptions   Medication Sig Dispense Refill    MAPAP 325 mg tablet       ibuprofen (MOTRIN) 200 mg tablet Take 800 mg by mouth two (2) times daily as needed for Pain.  topiramate (TOPAMAX) 50 mg tablet Take 1 Tab by mouth two (2) times a day.  Indications: MIGRAINE PREVENTION, SIMPLE-PARTIAL EPILEPSY 60 Tab 6       Vital signs:    Visit Vitals    /72 (BP 1 Location: Right arm, BP Patient Position: Sitting)    Pulse 97    Temp 98.2 °F (36.8 °C) (Oral)    Resp 12    Ht 6' 3\" (1.905 m)    Wt 71.9 kg (158 lb 9.6 oz)    SpO2 98%    BMI 19.82 kg/m2       Review of Systems:   As above otherwise 11 point review of systems negative including;   Constitutional no fever or chills  Skin denies rash or itching  HEENT  Denies tinnitus, hearing lose  Eyes denies diplopia vision lose  Respiratory denies sortness of breath  Cardiovascular denies chest pain, dyspnea on exertion  Gastrointestinal denies nausea, vomiting, diarrhea, constipation  Genitourinary denies incontinence  Musculoskeletal denies joint pain or swelling  Endocrine denies weight change  Hematology denies easy bruising or bleeding   Neurological as above in HPI      Patient Active Problem List   Diagnosis Code    Intractable migraine without aura and with status migrainosus G43.011    Seizure ([facility] R56.9         Objective: The patient is awake, alert, and oriented x 4. Fund of knowledge is adequate. Speech is fluent and memory is intact. Cranial Nerves: II - Visual fields are full to confrontation. III, IV, VI - Extraocular movements are intact. There is no nystagmus. V - Facial sensation is intact to pinprick. VII - Face is symmetrical.  VIII - Hearing is present. IX, X, XII - Palate is symmetrical.   XI - Shoulder shrugging and head turning intact  Motor: The patient moves all four limbs fairly well and symmetrically. Tone is normal. Reflexes are 2+ and symmetrical. Plantars are down going. Gait is normal.    CBC: No results found for: WBC, RBC, HGB, HCT, PLT, HGBEXT, HCTEXT, PLTEXT  BMP: No results found for: GLU, NA, K, CL, CO2, BUN, CREA, CA  CMP: No results found for: GLU, NA, K, CL, CO2, BUN, CREA, CA, AGAP, BUCR, TBIL, GPT, AP, TP, ALB, GLOB, AGRAT  Coagulation: No results found for: PTP, INR, APTT, PTTT    Document Text      []Hide copied text  []Hover for attribution information  [place]     Name:  La Nena Pelaez  MR#:  [id number]  :  1990  Account #:  [de-identified]  Date of Adm:  2017  Date of Service:  2017        ELECTROENCEPHALOGRAM NUMBER: [facility] .     CLINICAL: This is an apparently wakeful EEG on this 27year-old with  past medical history of having had a seizure-like spell back on  2017. He was on vacation in the [facility].     MEDICATIONS: Currently include ibuprofen.     EEG REPORT: The predominant, apparently wakeful background  consists of 10-15 microvolt, irregular but symmetrical, 9-10 Hz waves  which attenuate well with eye opening.  Bifrontal 2 to 3 microvolt, 16-20  Hz activity is seen which is symmetrical in its occurrence.     Step-flash photic stimulation was performed that caused no change in  this record. Hyperventilation was performed that caused no change in  the recording.     IMPRESSION: Normal wakeful electroencephalogram. No epileptiform  or focal abnormality was identified.           MD ROSY Quezada / Rodger Sun  D:  06/21/2017   11:18  T:  06/21/2017   12:11            Assessment:  Worst headache of his life with persistent pain and syncope episode who has risk factors including none. Negative workup for source of headaches. To my impression he's had 2 seizures at this time. Plan: At this time he's been directed to follow up with the [facility] neurologist.  I'll see him as needed and I'm available should he wish to follow up here again. Sincerely,        Sandhya Nicholson.  Holly Conley M.D.

## 2019-02-14 ENCOUNTER — HOSPITAL ENCOUNTER (OUTPATIENT)
Dept: GENERAL RADIOLOGY | Age: 29
Discharge: HOME | End: 2019-02-14
Attending: PHYSICIAN ASSISTANT
Payer: OTHER GOVERNMENT

## 2019-02-14 DIAGNOSIS — K21.9 GASTROESOPHAGEAL REFLUX DISEASE: ICD-10-CM

## 2019-02-14 DIAGNOSIS — B34.9 VIRAL INFECTION: ICD-10-CM

## 2019-02-14 PROCEDURE — 74011000255 HC RX REV CODE- 255: Performed by: PHYSICIAN ASSISTANT

## 2019-02-14 PROCEDURE — 74011000250 HC RX REV CODE- 250: Performed by: PHYSICIAN ASSISTANT

## 2019-02-14 PROCEDURE — 74220 X-RAY XM ESOPHAGUS 1CNTRST: CPT

## 2019-02-14 RX ADMIN — ANTACID/ANTIFLATULENT 4 G: 380; 550; 10; 10 GRANULE, EFFERVESCENT ORAL at 09:40

## 2019-02-14 RX ADMIN — BARIUM SULFATE 88 G: 960 POWDER, FOR SUSPENSION ORAL at 09:40

## 2019-02-14 RX ADMIN — BARIUM SULFATE 135 ML: 980 POWDER, FOR SUSPENSION ORAL at 09:40
